# Patient Record
Sex: MALE | Race: WHITE | Employment: FULL TIME | RURAL
[De-identification: names, ages, dates, MRNs, and addresses within clinical notes are randomized per-mention and may not be internally consistent; named-entity substitution may affect disease eponyms.]

---

## 2022-02-14 ENCOUNTER — HOSPITAL ENCOUNTER (EMERGENCY)
Age: 57
Discharge: ANOTHER HOSPITAL | End: 2022-02-15
Attending: FAMILY MEDICINE
Payer: MEDICAID

## 2022-02-14 ENCOUNTER — APPOINTMENT (OUTPATIENT)
Dept: GENERAL RADIOLOGY | Age: 57
End: 2022-02-14
Attending: FAMILY MEDICINE
Payer: MEDICAID

## 2022-02-14 DIAGNOSIS — I20.0 UNSTABLE ANGINA (HCC): Primary | ICD-10-CM

## 2022-02-14 LAB
ALBUMIN SERPL-MCNC: 3.2 G/DL (ref 3.5–5)
ALBUMIN/GLOB SERPL: 0.8 {RATIO} (ref 1.1–2.2)
ALP SERPL-CCNC: 118 U/L (ref 45–117)
ALT SERPL-CCNC: 32 U/L (ref 12–78)
ANION GAP SERPL CALC-SCNC: 14 MMOL/L (ref 5–15)
AST SERPL-CCNC: 18 U/L (ref 15–37)
BASOPHILS # BLD: 0.1 K/UL (ref 0–0.1)
BASOPHILS NFR BLD: 1 % (ref 0–1)
BILIRUB SERPL-MCNC: 0.3 MG/DL (ref 0.2–1)
BUN SERPL-MCNC: 18 MG/DL (ref 6–20)
BUN/CREAT SERPL: 15 (ref 12–20)
CALCIUM SERPL-MCNC: 8.3 MG/DL (ref 8.5–10.1)
CHLORIDE SERPL-SCNC: 100 MMOL/L (ref 97–108)
CO2 SERPL-SCNC: 25 MMOL/L (ref 21–32)
CREAT SERPL-MCNC: 1.21 MG/DL (ref 0.7–1.3)
DIFFERENTIAL METHOD BLD: ABNORMAL
EOSINOPHIL # BLD: 0.4 K/UL (ref 0–0.4)
EOSINOPHIL NFR BLD: 3 % (ref 0–7)
ERYTHROCYTE [DISTWIDTH] IN BLOOD BY AUTOMATED COUNT: 14 % (ref 11.5–14.5)
GLOBULIN SER CALC-MCNC: 4 G/DL (ref 2–4)
GLUCOSE SERPL-MCNC: 132 MG/DL (ref 65–100)
HCT VFR BLD AUTO: 47.8 % (ref 36.6–50.3)
HGB BLD-MCNC: 16 G/DL (ref 12.1–17)
IMM GRANULOCYTES # BLD AUTO: 0.1 K/UL (ref 0–0.04)
IMM GRANULOCYTES NFR BLD AUTO: 1 % (ref 0–0.5)
LYMPHOCYTES # BLD: 4.2 K/UL (ref 0.8–3.5)
LYMPHOCYTES NFR BLD: 32 % (ref 12–49)
MAGNESIUM SERPL-MCNC: 1.6 MG/DL (ref 1.6–2.4)
MCH RBC QN AUTO: 28.4 PG (ref 26–34)
MCHC RBC AUTO-ENTMCNC: 33.5 G/DL (ref 30–36.5)
MCV RBC AUTO: 84.8 FL (ref 80–99)
MONOCYTES # BLD: 1 K/UL (ref 0–1)
MONOCYTES NFR BLD: 7 % (ref 5–13)
NEUTS SEG # BLD: 7.4 K/UL (ref 1.8–8)
NEUTS SEG NFR BLD: 56 % (ref 32–75)
NRBC # BLD: 0 K/UL (ref 0–0.01)
NRBC BLD-RTO: 0 PER 100 WBC
PLATELET # BLD AUTO: 173 K/UL (ref 150–400)
PMV BLD AUTO: 9.4 FL (ref 8.9–12.9)
POTASSIUM SERPL-SCNC: 3.3 MMOL/L (ref 3.5–5.1)
PROT SERPL-MCNC: 7.2 G/DL (ref 6.4–8.2)
RBC # BLD AUTO: 5.64 M/UL (ref 4.1–5.7)
SODIUM SERPL-SCNC: 139 MMOL/L (ref 136–145)
TROPONIN-HIGH SENSITIVITY: 8 NG/L (ref 0–76)
WBC # BLD AUTO: 13.1 K/UL (ref 4.1–11.1)

## 2022-02-14 PROCEDURE — 99285 EMERGENCY DEPT VISIT HI MDM: CPT

## 2022-02-14 PROCEDURE — 96361 HYDRATE IV INFUSION ADD-ON: CPT

## 2022-02-14 PROCEDURE — 85730 THROMBOPLASTIN TIME PARTIAL: CPT

## 2022-02-14 PROCEDURE — 71045 X-RAY EXAM CHEST 1 VIEW: CPT

## 2022-02-14 PROCEDURE — 96374 THER/PROPH/DIAG INJ IV PUSH: CPT

## 2022-02-14 PROCEDURE — 85025 COMPLETE CBC W/AUTO DIFF WBC: CPT

## 2022-02-14 PROCEDURE — 96365 THER/PROPH/DIAG IV INF INIT: CPT

## 2022-02-14 PROCEDURE — 80053 COMPREHEN METABOLIC PANEL: CPT

## 2022-02-14 PROCEDURE — 36415 COLL VENOUS BLD VENIPUNCTURE: CPT

## 2022-02-14 PROCEDURE — 74011250637 HC RX REV CODE- 250/637: Performed by: FAMILY MEDICINE

## 2022-02-14 PROCEDURE — 74011250636 HC RX REV CODE- 250/636: Performed by: FAMILY MEDICINE

## 2022-02-14 PROCEDURE — 93005 ELECTROCARDIOGRAM TRACING: CPT

## 2022-02-14 PROCEDURE — 84484 ASSAY OF TROPONIN QUANT: CPT

## 2022-02-14 PROCEDURE — 83735 ASSAY OF MAGNESIUM: CPT

## 2022-02-14 RX ORDER — NALOXONE HYDROCHLORIDE 4 MG/.1ML
SPRAY NASAL
COMMUNITY
Start: 2021-08-27 | End: 2022-08-27

## 2022-02-14 RX ORDER — BISACODYL 5 MG
TABLET, DELAYED RELEASE (ENTERIC COATED) ORAL
COMMUNITY
Start: 2022-01-03

## 2022-02-14 RX ORDER — LOSARTAN POTASSIUM 100 MG/1
1 TABLET ORAL DAILY
COMMUNITY
Start: 2021-08-27 | End: 2022-08-27

## 2022-02-14 RX ORDER — OXYCODONE AND ACETAMINOPHEN 5; 325 MG/1; MG/1
TABLET ORAL
COMMUNITY
Start: 2021-08-27 | End: 2022-05-31

## 2022-02-14 RX ORDER — SODIUM CHLORIDE 9 MG/ML
100 INJECTION, SOLUTION INTRAVENOUS ONCE
Status: COMPLETED | OUTPATIENT
Start: 2022-02-14 | End: 2022-02-15

## 2022-02-14 RX ORDER — GUAIFENESIN 100 MG/5ML
162 LIQUID (ML) ORAL
Status: COMPLETED | OUTPATIENT
Start: 2022-02-14 | End: 2022-02-14

## 2022-02-14 RX ORDER — CHLORTHALIDONE 25 MG/1
1 TABLET ORAL DAILY
COMMUNITY
Start: 2021-08-27 | End: 2022-08-27

## 2022-02-14 RX ORDER — PANTOPRAZOLE SODIUM 40 MG/1
1 TABLET, DELAYED RELEASE ORAL DAILY
COMMUNITY
Start: 2021-08-27 | End: 2022-08-27

## 2022-02-14 RX ORDER — POTASSIUM CHLORIDE 750 MG/1
40 TABLET, FILM COATED, EXTENDED RELEASE ORAL
Status: COMPLETED | OUTPATIENT
Start: 2022-02-14 | End: 2022-02-14

## 2022-02-14 RX ORDER — GUAIFENESIN 100 MG/5ML
81 LIQUID (ML) ORAL
COMMUNITY
Start: 2021-08-27 | End: 2022-08-27

## 2022-02-14 RX ORDER — POTASSIUM CHLORIDE 7.45 MG/ML
10 INJECTION INTRAVENOUS
Status: COMPLETED | OUTPATIENT
Start: 2022-02-14 | End: 2022-02-15

## 2022-02-14 RX ORDER — METOPROLOL SUCCINATE 100 MG/1
1 TABLET, EXTENDED RELEASE ORAL DAILY
COMMUNITY
Start: 2021-08-27 | End: 2022-08-27

## 2022-02-14 RX ORDER — ROSUVASTATIN CALCIUM 40 MG/1
40 TABLET, COATED ORAL DAILY
COMMUNITY
Start: 2022-02-08 | End: 2023-02-08

## 2022-02-14 RX ORDER — MELATONIN
25 DAILY
COMMUNITY
Start: 2022-02-08 | End: 2023-02-08

## 2022-02-14 RX ORDER — NITROGLYCERIN 0.4 MG/1
0.4 TABLET SUBLINGUAL
Status: COMPLETED | OUTPATIENT
Start: 2022-02-14 | End: 2022-02-14

## 2022-02-14 RX ORDER — NITROGLYCERIN 0.4 MG/1
0.4 TABLET SUBLINGUAL
COMMUNITY
Start: 2021-08-27 | End: 2022-08-27

## 2022-02-14 RX ORDER — GABAPENTIN 300 MG/1
300 CAPSULE ORAL 3 TIMES DAILY
COMMUNITY

## 2022-02-14 RX ADMIN — SODIUM CHLORIDE 250 ML: 9 INJECTION, SOLUTION INTRAVENOUS at 23:13

## 2022-02-14 RX ADMIN — POTASSIUM CHLORIDE 40 MEQ: 750 TABLET, FILM COATED, EXTENDED RELEASE ORAL at 23:52

## 2022-02-14 RX ADMIN — SODIUM CHLORIDE 100 ML/HR: 9 INJECTION, SOLUTION INTRAVENOUS at 23:36

## 2022-02-14 RX ADMIN — ASPIRIN 81 MG CHEWABLE TABLET 162 MG: 81 TABLET CHEWABLE at 23:13

## 2022-02-14 RX ADMIN — POTASSIUM CHLORIDE 10 MEQ: 7.46 INJECTION, SOLUTION INTRAVENOUS at 23:53

## 2022-02-14 RX ADMIN — NITROGLYCERIN 0.4 MG: 0.4 TABLET, ORALLY DISINTEGRATING SUBLINGUAL at 23:14

## 2022-02-15 VITALS
BODY MASS INDEX: 39.53 KG/M2 | TEMPERATURE: 98 F | OXYGEN SATURATION: 96 % | SYSTOLIC BLOOD PRESSURE: 108 MMHG | DIASTOLIC BLOOD PRESSURE: 67 MMHG | RESPIRATION RATE: 12 BRPM | WEIGHT: 246 LBS | HEIGHT: 66 IN | HEART RATE: 74 BPM

## 2022-02-15 LAB
APTT PPP: 26.5 SEC (ref 22.1–31)
ATRIAL RATE: 93 BPM
CALCULATED P AXIS, ECG09: 23 DEGREES
CALCULATED R AXIS, ECG10: 42 DEGREES
CALCULATED T AXIS, ECG11: 12 DEGREES
DIAGNOSIS, 93000: NORMAL
FLUAV RNA SPEC QL NAA+PROBE: NOT DETECTED
FLUBV RNA SPEC QL NAA+PROBE: NOT DETECTED
P-R INTERVAL, ECG05: 192 MS
Q-T INTERVAL, ECG07: 380 MS
QRS DURATION, ECG06: 98 MS
QTC CALCULATION (BEZET), ECG08: 472 MS
SARS-COV-2, COV2: NOT DETECTED
THERAPEUTIC RANGE,PTTT: NORMAL SECS (ref 58–77)
TROPONIN-HIGH SENSITIVITY: 7 NG/L (ref 0–76)
VENTRICULAR RATE, ECG03: 93 BPM

## 2022-02-15 PROCEDURE — 96375 TX/PRO/DX INJ NEW DRUG ADDON: CPT

## 2022-02-15 PROCEDURE — 36415 COLL VENOUS BLD VENIPUNCTURE: CPT

## 2022-02-15 PROCEDURE — 96366 THER/PROPH/DIAG IV INF ADDON: CPT

## 2022-02-15 PROCEDURE — 74011250636 HC RX REV CODE- 250/636: Performed by: FAMILY MEDICINE

## 2022-02-15 PROCEDURE — 74011250637 HC RX REV CODE- 250/637: Performed by: FAMILY MEDICINE

## 2022-02-15 PROCEDURE — 84484 ASSAY OF TROPONIN QUANT: CPT

## 2022-02-15 PROCEDURE — 87636 SARSCOV2 & INF A&B AMP PRB: CPT

## 2022-02-15 RX ORDER — SODIUM CHLORIDE 9 MG/ML
100 INJECTION, SOLUTION INTRAVENOUS ONCE
Status: COMPLETED | OUTPATIENT
Start: 2022-02-15 | End: 2022-02-15

## 2022-02-15 RX ORDER — ONDANSETRON 2 MG/ML
4 INJECTION INTRAMUSCULAR; INTRAVENOUS
Status: COMPLETED | OUTPATIENT
Start: 2022-02-15 | End: 2022-02-15

## 2022-02-15 RX ORDER — HEPARIN SODIUM 10000 [USP'U]/100ML
8-25 INJECTION, SOLUTION INTRAVENOUS
Status: DISCONTINUED | OUTPATIENT
Start: 2022-02-15 | End: 2022-02-15 | Stop reason: HOSPADM

## 2022-02-15 RX ORDER — HEPARIN SODIUM 1000 [USP'U]/ML
4000 INJECTION, SOLUTION INTRAVENOUS; SUBCUTANEOUS ONCE
Status: COMPLETED | OUTPATIENT
Start: 2022-02-15 | End: 2022-02-15

## 2022-02-15 RX ADMIN — HEPARIN SODIUM 8 UNITS/KG/HR: 10000 INJECTION, SOLUTION INTRAVENOUS at 01:43

## 2022-02-15 RX ADMIN — SODIUM CHLORIDE 500 ML: 9 INJECTION, SOLUTION INTRAVENOUS at 03:44

## 2022-02-15 RX ADMIN — SODIUM CHLORIDE 100 ML/HR: 9 INJECTION, SOLUTION INTRAVENOUS at 04:10

## 2022-02-15 RX ADMIN — NITROGLYCERIN 1 INCH: 20 OINTMENT TOPICAL at 01:13

## 2022-02-15 RX ADMIN — ONDANSETRON 4 MG: 2 INJECTION INTRAMUSCULAR; INTRAVENOUS at 01:06

## 2022-02-15 RX ADMIN — HEPARIN SODIUM 4000 UNITS: 1000 INJECTION INTRAVENOUS; SUBCUTANEOUS at 01:31

## 2022-02-15 NOTE — ED NOTES
TRANSFER - OUT REPORT:    Verbal report given to Tami Bermudez [facility] on Raquel Onofre  being transferred to [facility] ED for urgent transfer       Report consisted of patients Situation, Background, Assessment and   Recommendations(SBAR). Information from the following report(s) SBAR, ED Summary and Intake/Output,Med Rec was reviewed with the receiving nurse. Lines:   Peripheral IV 02/14/22 Anterior;Left;Proximal Forearm (Active)       Peripheral IV 02/15/22 Right Antecubital (Active)   Site Assessment Clean, dry, & intact 02/15/22 0119   Phlebitis Assessment 0 02/15/22 0119   Infiltration Assessment 0 02/15/22 0119   Dressing Status Clean, dry, & intact 02/15/22 0119   Dressing Type Transparent 02/15/22 0119   Hub Color/Line Status Pink;Flushed 02/15/22 0119        Opportunity for questions and clarification was provided.       Patient transported with:   Monitor  O2 @ 2 liters   NS infusion 100ml/hr  Heparin Drip 8.9ml/hr

## 2022-02-15 NOTE — PROGRESS NOTES
8:00AM Began working on obtaining an authorization for transportation for the patient via ambulance to tertiary care through his insurance company. CHAIN OF EVENTS/CALLS:    1. [phone] (spoke to [address] [place] [address])   She couldn't help. Gave me 2 numbers to try. FROILAN Duran 45 [phone]  2. [phone]([facility] to  Dorothy Myers)   She couldn't help. Transferred me to   [phone]: (Spoke to Maral Arrieta.) She transferred    Me to Sid Shannon who gave me 2 more numbers:  3. [phone] and [phone]. 4. [phone]([facility] to [place]) No help at all left   Me on hold. Khalif Banter up and dialed another number    5. [phone]([facility] with Delmus Rolling) She stated   I have to schedule a trip in which they would find   A ride for the patient after being told patient has   Already left. She transferred me to Ambulance   Team: Cr Aldana who put me on hold from 10:15 -1025)                6.   Tillman Bees to see if they could help me but   They could not. Spoke with Miley Rowan who [facility] me [facility]  [facility] [address] who was going to   Get someone from [facility] [place] [place] to call me back. 7. Called Transportation number [phone] gave:    [phone]: (spoke with Zoraida) Couldn't do   Anything.     8.. Called the number I started off with: [phone]    Spoke with Savannah> checked with supervisor to     To tell me for  EMERGENCY TRANSPORTS FROM    ONE FACILITY TO ANOTHER DID NOT NEED    AUTHORIZATION.  8:00 am to 12:15pm.

## 2022-02-15 NOTE — ED NOTES
Chest tightness and nausea resolved.  Pt with history of sleep apnea -SpO2 decreases with rest. Pt placed on oxygen 2 lpm via NC.

## 2022-02-15 NOTE — ED TRIAGE NOTES
Pt states cp off and on x 2 weeks taking NTG frequently. Refused admission when he saw cardiologist at [facility] last week.  Tonight x 2.5 hours took total of 3 NTG last 15 mins before arrival.

## 2022-02-15 NOTE — PROGRESS NOTES
0116 - Dr. Karsten Tee advised to arranged ALS transportation from [facility] to [facility] ED.   Arranged ALS transport w/JAMILA Gerardo) - advised to bring appropiate equipment (cardiac monitor, IVPump for Heparin drip, oxygen) - ETA will be called back by [facility] supervisor Claudeen Memory - updated ED staff w/ETA

## 2022-02-15 NOTE — ED PROVIDER NOTES
EMERGENCY DEPARTMENT HISTORY AND PHYSICAL EXAM          Date: 2/14/2022  Patient Name: Jazz Aguirre    History of Presenting Illness     Chief Complaint   Patient presents with    Chest Pain       History Provided By: Patient    HPI: Jazz Aguirre is a 65 y.o. male, pmhx CAD, chronic pain, who comes to the ED tonight with chest pain that started about 2 or 3 hours ago. He reports that he was at [facility] about 2 weeks ago and his doctor, whose name he cannot recall, wanted to admit him at that time. He refused admission, but since then he has had chest pain intermittently. His friend brought him to the ED for evaluation. He has had no dyspnea, diaphoresis, or nausea. Tonight over the last three hours he has had 3 nitroglycerin tabs. He smokes a ppd of cigarettes. His last stent was about 4 years ago; he knows he has had 2 stents, but is not sure. Pain is around a 2 or 3 at present. PCP: Braden, Not On File, PA-C    Allergies: hydrocodone, isosorbide, cephalexin. Social Hx: 1 ppd tobacco, denies vaping, EtOH, Illicit Drugs; Lives locally. There are no other complaints, changes, or physical findings at this time. Current Facility-Administered Medications   Medication Dose Route Frequency Provider Last Rate Last Admin    heparin (porcine) 25,000 units in 0.45% saline 250 ml infusion  8-25 Units/kg/hr IntraVENous TITRATE Ivana Richards MD 8.9 mL/hr at 02/15/22 0143 8 Units/kg/hr at 02/15/22 0143     Current Outpatient Medications   Medication Sig Dispense Refill    oxyCODONE-acetaminophen (PERCOCET) 5-325 mg per tablet **EARLIEST FILL 2/10 TAKE 1 TABLET BY MOUTH EVERY 4 HOURS AS NEEDED FOR PAIN      nitroglycerin (NITROSTAT) 0.4 mg SL tablet 0.4 mg.      naloxone (NARCAN) 4 mg/actuation nasal spray GIVE 1 SPRAY INTO ONE NOSTRIL. CALL 911. GIVE ADDITIONAL DOSES USING A NEW NASAL SPRAY IF PATIENT DOES NOT RESPOND, OR RESPONDS THEN RELAPSES, EVERY 2 TO 3 MINUTES UNTIL HELP ARRIVES.       metoprolol succinate (TOPROL-XL) 100 mg tablet Take 1 Tablet by mouth daily.  losartan (COZAAR) 100 mg tablet Take 1 Tablet by mouth daily.  empagliflozin (JARDIANCE) 10 mg tablet Take 1 Tablet by mouth daily.  cholecalciferol (VITAMIN D3) (1000 Units /25 mcg) tablet Take 25 mcg by mouth daily.  chlorthalidone (HYGROTON) 25 mg tablet Take 1 Tablet by mouth daily.  bisacodyL (DULCOLAX) 5 mg EC tablet Do not crush, chew, or split. Take as directed      aspirin 81 mg chewable tablet 81 mg.  pantoprazole (PROTONIX) 40 mg tablet Take 1 Tablet by mouth daily.  rosuvastatin (CRESTOR) 40 mg tablet Take 40 mg by mouth daily.  gabapentin (NEURONTIN) 300 mg capsule Take 300 mg by mouth three (3) times daily. Past History     Past Medical History:  Past Medical History:   Diagnosis Date    Anxiety     BPH (benign prostatic hyperplasia)     CAD (coronary artery disease)     Chronic low back pain     Diabetes ([facility] [address]     Dyslipidemia     HTN (hypertension)     Neuropathic pain     Obesity     BERNA (obstructive sleep apnea)     Osteoarthritis     Tobacco abuse        Past Surgical History:  History reviewed. No pertinent surgical history. Family History:  History reviewed. No pertinent family history. Social History:  Social History     Tobacco Use    Smoking status: Current Every Day Smoker     Packs/day: 1.00    Smokeless tobacco: Former User   Substance Use Topics    Alcohol use: Not Currently    Drug use: Not Currently       Allergies: Allergies   Allergen Reactions    Hydrocodone Anaphylaxis     Throat swells    Isosorbide Mononitrate Other (comments)     Severe HA    Keflex [Cephalexin] Diarrhea         Review of Systems   Review of Systems   Constitutional: Negative for appetite change, chills, fatigue and fever. HENT: Negative for congestion, ear pain, rhinorrhea, sore throat and trouble swallowing. Eyes: Negative for redness and visual disturbance. Respiratory: Negative for cough, chest tightness, shortness of breath and wheezing. Cardiovascular: Positive for chest pain. Negative for leg swelling. Gastrointestinal: Negative for abdominal pain, nausea and vomiting. Endocrine: Negative for cold intolerance, heat intolerance and polyuria. Genitourinary: Negative for dysuria, flank pain, frequency, hematuria and testicular pain. Musculoskeletal: Positive for back pain and neck pain. Negative for arthralgias and myalgias. Chronic back, neck pain. Unchanged from usual.   Skin: Negative for rash. Allergic/Immunologic: Negative for environmental allergies and food allergies. Neurological: Negative for dizziness, weakness and headaches. Physical Exam   Physical Exam  Vitals reviewed. Constitutional:       General: He is not in acute distress. Appearance: He is well-developed. He is not diaphoretic. HENT:      Head: Normocephalic and atraumatic. Right Ear: External ear normal.      Left Ear: External ear normal.      Mouth/Throat:      Pharynx: No oropharyngeal exudate. Eyes:      General: No scleral icterus. Right eye: No discharge. Left eye: No discharge. Conjunctiva/sclera: Conjunctivae normal.      Pupils: Pupils are equal, round, and reactive to light. Neck:      Thyroid: No thyromegaly. Vascular: No JVD. Trachea: No tracheal deviation. Cardiovascular:      Rate and Rhythm: Normal rate and regular rhythm. Pulses:           Radial pulses are 2+ on the right side and 2+ on the left side. Dorsalis pedis pulses are 2+ on the right side and 2+ on the left side. Posterior tibial pulses are 2+ on the right side and 2+ on the left side. Heart sounds: Normal heart sounds. No murmur heard. No systolic murmur is present. No diastolic murmur is present. No friction rub. No gallop. Pulmonary:      Effort: Pulmonary effort is normal. No respiratory distress.       Breath sounds: No wheezing, rhonchi or rales. Abdominal:      General: Bowel sounds are normal. There is no distension. Palpations: Abdomen is soft. Tenderness: There is no abdominal tenderness. There is no rebound. Musculoskeletal:         General: No tenderness or deformity. Normal range of motion. Cervical back: Normal range of motion and neck supple. Right lower leg: No edema. Left lower leg: No edema. Skin:     General: Skin is warm and dry. Neurological:      Mental Status: He is alert and oriented to person, place, and time. Cranial Nerves: No cranial nerve deficit. Coordination: Coordination normal.      Deep Tendon Reflexes: Reflexes are normal and symmetric. Diagnostic Study Results     Labs -     Recent Results (from the past 12 hour(s))   CBC WITH AUTOMATED DIFF    Collection Time: 02/14/22 11:05 PM   Result Value Ref Range    WBC 13.1 (H) 4.1 - 11.1 K/uL    RBC 5.64 4.10 - 5.70 M/uL    HGB 16.0 12.1 - 17.0 g/dL    HCT 47.8 36.6 - 50.3 %    MCV 84.8 80.0 - 99.0 FL    MCH 28.4 26.0 - 34.0 PG    MCHC 33.5 30.0 - 36.5 g/dL    RDW 14.0 11.5 - 14.5 %    PLATELET 994 771 - 809 K/uL    MPV 9.4 8.9 - 12.9 FL    NRBC 0.0 0  WBC    ABSOLUTE NRBC 0.00 0.00 - 0.01 K/uL    NEUTROPHILS 56 32 - 75 %    LYMPHOCYTES 32 12 - 49 %    MONOCYTES 7 5 - 13 %    EOSINOPHILS 3 0 - 7 %    BASOPHILS 1 0 - 1 %    IMMATURE GRANULOCYTES 1 (H) 0.0 - 0.5 %    ABS. NEUTROPHILS 7.4 1.8 - 8.0 K/UL    ABS. LYMPHOCYTES 4.2 (H) 0.8 - 3.5 K/UL    ABS. MONOCYTES 1.0 0.0 - 1.0 K/UL    ABS. EOSINOPHILS 0.4 0.0 - 0.4 K/UL    ABS. BASOPHILS 0.1 0.0 - 0.1 K/UL    ABS. IMM.  GRANS. 0.1 (H) 0.00 - 0.04 K/UL    DF AUTOMATED     METABOLIC PANEL, COMPREHENSIVE    Collection Time: 02/14/22 11:05 PM   Result Value Ref Range    Sodium 139 136 - 145 mmol/L    Potassium 3.3 (L) 3.5 - 5.1 mmol/L    Chloride 100 97 - 108 mmol/L    CO2 25 21 - 32 mmol/L    Anion gap 14 5 - 15 mmol/L    Glucose 132 (H) 65 - 100 mg/dL    BUN 18 6 - 20 MG/DL    Creatinine 1.21 0.70 - 1.30 MG/DL    BUN/Creatinine ratio 15 12 - 20      GFR est AA >60 >60 ml/min/1.73m2    GFR est non-AA >60 >60 ml/min/1.73m2    Calcium 8.3 (L) 8.5 - 10.1 MG/DL    Bilirubin, total 0.3 0.2 - 1.0 MG/DL    ALT (SGPT) 32 12 - 78 U/L    AST (SGOT) 18 15 - 37 U/L    Alk. phosphatase 118 (H) 45 - 117 U/L    Protein, total 7.2 6.4 - 8.2 g/dL    Albumin 3.2 (L) 3.5 - 5.0 g/dL    Globulin 4.0 2.0 - 4.0 g/dL    A-G Ratio 0.8 (L) 1.1 - 2.2     TROPONIN-HIGH SENSITIVITY    Collection Time: 02/14/22 11:05 PM   Result Value Ref Range    Troponin-High Sensitivity 8 0 - 76 ng/L   MAGNESIUM    Collection Time: 02/14/22 11:05 PM   Result Value Ref Range    Magnesium 1.6 1.6 - 2.4 mg/dL   PTT    Collection Time: 02/14/22 11:05 PM   Result Value Ref Range    aPTT 26.5 22.1 - 31.0 sec    aPTT, therapeutic range     58.0 - 77.0 SECS   TROPONIN-HIGH SENSITIVITY    Collection Time: 02/15/22  1:05 AM   Result Value Ref Range    Troponin-High Sensitivity 7 0 - 76 ng/L   COVID-19 WITH INFLUENZA A/B    Collection Time: 02/15/22  1:08 AM   Result Value Ref Range    SARS-CoV-2 Not detected NOTD      Influenza A by PCR Not detected NOTD      Influenza B by PCR Not detected NOTD         Radiologic Studies -   XR CHEST PORT   Final Result   No acute findings. CT Results  (Last 48 hours)    None        CXR Results  (Last 48 hours)               02/14/22 2328  XR CHEST PORT Final result    Impression:  No acute findings. Narrative:  EXAM: XR CHEST PORT       INDICATION: chest pain       COMPARISON: None. FINDINGS: A portable AP radiograph of the chest was obtained at 23:01 hours. The   patient is on a cardiac monitor. The lungs are clear.  The cardiac and   mediastinal contours and pulmonary vascularity are normal.  The bones and soft   tissues show degenerative spine change with lower cervical ACDF plate and screws   and osteoarthritic change of the shoulders but otherwise are grossly within   normal limits. Medical Decision Making   I am the first provider for this patient. I reviewed the vital signs, available nursing notes, past medical history, past surgical history, family history and social history. Vital Signs-Reviewed the patient's vital signs. Patient Vitals for the past 12 hrs:   Temp Pulse Resp BP SpO2   02/15/22 0234  85 20 121/62 91 %   02/15/22 0219  84 24 130/85 94 %   02/15/22 0204  84 24 116/82 96 %   02/15/22 0149  85 25 122/79 94 %   02/15/22 0140  79 22  97 %   02/15/22 0134  81 22  93 %   02/15/22 0125  78 20 138/84 94 %   02/15/22 0119  83 21  90 %   02/15/22 0113  85  109/69    02/15/22 0110  86 11 109/69 94 %   02/15/22 0104  84 14 122/89 91 %   02/15/22 0055  90 23 111/80    02/15/22 0049  84 23     02/15/22 0040  83 25 128/81    02/15/22 0034  81 14 114/61 96 %   02/15/22 0025  85 19  93 %   02/15/22 0019  88 23 128/88 92 %   02/15/22 0010  88 22  91 %   02/15/22 0004  89 19 128/88 95 %   02/14/22 2355  90 14  92 %   02/14/22 2349  92 26 127/79 95 %   02/14/22 2340  88 16  95 %   02/14/22 2334  88 24 135/89 93 %   02/14/22 2325  90 22 106/68 94 %   02/14/22 2319  94 16 106/68 93 %   02/14/22 2314  89  119/72    02/14/22 2310  90 18 119/72 94 %   02/14/22 2304  95 15 133/77 99 %   02/14/22 2259 98.7 °F (37.1 °C) 97 20 133/77 97 %       Pulse Oximetry Analysis - 97% on RA    Cardiac Monitor:   Rate: 90 bpm  Rhythm: Normal Sinus Rhythm      Records Reviewed: Nursing Notes, Old Medical Records, Previous Radiology Studies and Previous Laboratory Studies    Provider Notes (Medical Decision Making):   MDM: Pt is a 1 ppd smoker with a h/o CAD, who has had chest pain for the last two weeks, more severe this evening. EKG looks normal. Will give aspirin, nitroglycerin, and send labs. ED Course:   Initial assessment performed.  The patients presenting problems have been discussed, and they are in agreement with the care plan formulated and outlined with them. I have encouraged them to ask questions as they arise throughout their visit. EKG interpretation: (Preliminary)  Rhythm: NSR, HR 93, No ST changes. Low voltage. This EKG was interpreted by ED Provider Dr Petar Corona MD    11:07 PM   Spent 3 minutes discussing the risks of smoking and the benefits of smoking cessation as well as the long term sequelae of smoking with the pt who verbalized his understanding. Reviewed strategies for success, including gradually decreasing the number of cigarettes smoked a day. PROGRESS NOTE:    ED Course as of 02/15/22 0321   Mon Feb 14, 2022   2332 Pain down to 0 after a single nitroglycerin. [VG]   2074 Troponin 8. Pain level 1. Will call [facility] to discuss possible transfer. [VG]   Tue Feb 15, 2022   [address] [facility] CICU fellow. [VG]   0120 Case discussed with Dr. Neto Yin in CICU, who felt that patient was appropriate for transfer but no beds available in the CICU. Accepted by Dr. Michael Rider in ED at [facility]. [VG]   0319 Resting comfortably. First estimate for [facility] ETA was 0600 at around 0200. Have been able to move it up to 0500. Pt resting comfortably. Repeat troponin 7. COVID negative. [VG]      ED Course User Index  [VG] Sandie Pal MD          CRITICAL CARE NOTE :    1:25 AM      IMPENDING DETERIORATION -Respiratory, Cardiovascular and Metabolic    ASSOCIATED RISK FACTORS - Hypotension, Shock, Dysrhythmia and Vascular Compromise    MANAGEMENT- Bedside Assessment, Supervision of Care and Transfer    INTERPRETATION -  Xrays, ECG and Blood Pressure    INTERVENTIONS - hemodynamic mngmt and vascular control    CASE REVIEW - Hospitalist/Intensivist and Medical Sub-Specialist    TREATMENT RESPONSE -Improved    PERFORMED BY - Self    I have spent 60 minutes of critical care time involved in lab review, consultations with specialist, family decision- making, bedside attention and documentation.  During this entire length of time I was immediately available to the patient . Diagnosis     Clinical Impression:   1. Unstable angina (HCC)        PLAN:  Transfer [facility] ED     Aspirin, Nitropaste (1\"), Heparin infusion.

## 2022-02-15 NOTE — PROGRESS NOTES
0200 - Dr Claudetta Pyles upgraded transport to [address] [place] [address] - notified Kerri Fisherle w/[facility] - ETA remains to same, 0600 - updated ED    Tomer Nava (supervisor at [facility]) gave #[phone] to escalade - spoke w/Ravi Irizarry w/[facility] - ETA at 5-6am - advised [facility] Estephania Bryant RN) and ED Staff & Dr. Claudetta Pyles.       3235 - Dr. Claudetta Pyles asked writer to call [address] to see if [address] [place] [address] truck is available to take pt to [facility] - due to the time, [facility] will arrive the soonest

## 2022-12-05 ENCOUNTER — HOSPITAL ENCOUNTER (EMERGENCY)
Age: 57
Discharge: HOME | End: 2022-12-05
Attending: EMERGENCY MEDICINE
Payer: MEDICAID

## 2022-12-05 VITALS
RESPIRATION RATE: 20 BRPM | DIASTOLIC BLOOD PRESSURE: 77 MMHG | SYSTOLIC BLOOD PRESSURE: 155 MMHG | OXYGEN SATURATION: 91 % | TEMPERATURE: 98.3 F | HEIGHT: 66 IN | BODY MASS INDEX: 40.66 KG/M2 | WEIGHT: 253 LBS | HEART RATE: 102 BPM

## 2022-12-05 DIAGNOSIS — S61.412A LACERATION OF LEFT HAND WITHOUT FOREIGN BODY, INITIAL ENCOUNTER: Primary | ICD-10-CM

## 2022-12-05 PROCEDURE — 75810000294 HC INTERM/LAYERED WND RPR

## 2022-12-05 PROCEDURE — 74011000250 HC RX REV CODE- 250: Performed by: EMERGENCY MEDICINE

## 2022-12-05 PROCEDURE — 99283 EMERGENCY DEPT VISIT LOW MDM: CPT

## 2022-12-05 RX ORDER — FLUTICASONE PROPIONATE AND SALMETEROL XINAFOATE 115; 21 UG/1; UG/1
AEROSOL, METERED RESPIRATORY (INHALATION)
COMMUNITY
Start: 2022-11-23

## 2022-12-05 RX ORDER — POTASSIUM CHLORIDE 750 MG/1
TABLET, FILM COATED, EXTENDED RELEASE ORAL
COMMUNITY
Start: 2022-03-07

## 2022-12-05 RX ORDER — EMPAGLIFLOZIN 10 MG/1
TABLET, FILM COATED ORAL
COMMUNITY
Start: 2022-11-03

## 2022-12-05 RX ORDER — GUAIFENESIN 100 MG/5ML
LIQUID (ML) ORAL
COMMUNITY
Start: 2022-11-15

## 2022-12-05 RX ORDER — OXYCODONE AND ACETAMINOPHEN 5; 325 MG/1; MG/1
1 TABLET ORAL
COMMUNITY
Start: 2023-01-14 | End: 2023-02-13

## 2022-12-05 RX ORDER — DOXYCYCLINE HYCLATE 100 MG
100 TABLET ORAL 2 TIMES DAILY
Qty: 14 TABLET | Refills: 0 | Status: SHIPPED | OUTPATIENT
Start: 2022-12-05 | End: 2022-12-12

## 2022-12-05 RX ORDER — PREGABALIN 75 MG/1
150 CAPSULE ORAL 2 TIMES DAILY
COMMUNITY
Start: 2022-11-17 | End: 2023-02-15

## 2022-12-05 RX ORDER — METOPROLOL TARTRATE 25 MG/1
TABLET, FILM COATED ORAL
COMMUNITY
Start: 2022-11-15

## 2022-12-05 RX ORDER — AMLODIPINE BESYLATE 5 MG/1
TABLET ORAL
COMMUNITY
Start: 2022-11-15

## 2022-12-05 RX ORDER — CEPHALEXIN 500 MG/1
500 CAPSULE ORAL 4 TIMES DAILY
Qty: 28 CAPSULE | Refills: 0 | Status: SHIPPED | OUTPATIENT
Start: 2022-12-05 | End: 2022-12-05 | Stop reason: SINTOL

## 2022-12-05 RX ORDER — EZETIMIBE 10 MG/1
10 TABLET ORAL AT BEDTIME
COMMUNITY
Start: 2022-03-07 | End: 2023-03-07

## 2022-12-05 RX ORDER — LIDOCAINE HYDROCHLORIDE 10 MG/ML
10 INJECTION INFILTRATION; PERINEURAL ONCE
Status: COMPLETED | OUTPATIENT
Start: 2022-12-05 | End: 2022-12-05

## 2022-12-05 RX ORDER — CLOPIDOGREL BISULFATE 75 MG/1
TABLET ORAL
COMMUNITY
Start: 2022-11-15

## 2022-12-05 RX ORDER — ALBUTEROL SULFATE 90 UG/1
AEROSOL, METERED RESPIRATORY (INHALATION)
COMMUNITY
Start: 2022-11-03

## 2022-12-05 RX ORDER — PANTOPRAZOLE SODIUM 40 MG/1
1 TABLET, DELAYED RELEASE ORAL DAILY
COMMUNITY
Start: 2022-03-07 | End: 2023-03-07

## 2022-12-05 RX ORDER — FUROSEMIDE 40 MG/1
TABLET ORAL
COMMUNITY
Start: 2022-11-15

## 2022-12-05 RX ADMIN — LIDOCAINE HYDROCHLORIDE 10 ML: 10 INJECTION, SOLUTION INFILTRATION; PERINEURAL at 18:27

## 2022-12-05 RX ADMIN — BACITRACIN ZINC, NEOMYCIN, POLYMYXIN B SULFAT 1 PACKET: 5000; 3.5; 4 OINTMENT TOPICAL at 19:10

## 2022-12-05 NOTE — ED TRIAGE NOTES
Pt presents with a laceration to the left hand from a table saw. Pt was cutting a piece of wood and cut it. Pt states he has had a tetanus in the last 10 years but doesn't remember the year.

## 2022-12-06 NOTE — ED PROVIDER NOTES
EMERGENCY DEPARTMENT HISTORY AND PHYSICAL EXAM          Date: 12/5/2022  Patient Name: Renetta Edmond    History of Presenting Illness     Chief Complaint   Patient presents with    Hand Laceration       History Provided By: Patient    HPI: Renetta Edmond is a 65 y.o. male, pmhx listed below, who presents to the ED c/o laceration. Patient reports he was using a saw when he accidentally cut his left hand. No other injuries. Not taking any blood thinning medications. Applied pressure prior to arrival.  Reports last tetanus was within the last few years. PCP: Braden, Not On File, MD    There are no other complaints, changes, or physical findings at this time. Past History       Past Medical History:  Past Medical History:   Diagnosis Date    Anxiety     BPH (benign prostatic hyperplasia)     CAD (coronary artery disease)     Chronic low back pain     Diabetes (HCC)     Dyslipidemia     HTN (hypertension)     Neuropathic pain     Obesity     BERNA (obstructive sleep apnea)     Osteoarthritis     Tobacco abuse        Past Surgical History:  History reviewed. No pertinent surgical history. Family History:  History reviewed. No pertinent family history. Social History:  Social History     Tobacco Use    Smoking status: Every Day     Packs/day: 1.00     Types: Cigarettes    Smokeless tobacco: Former   Substance Use Topics    Alcohol use: Not Currently    Drug use: Not Currently       Current Outpatient Medications   Medication Sig Dispense Refill    ezetimibe (ZETIA) 10 mg tablet Take 10 mg by mouth At bedtime. pantoprazole (PROTONIX) 40 mg tablet Take 1 Tablet by mouth daily. potassium chloride SR (KLOR-CON 10) 10 mEq tablet Take 1 tablet by mouth 2 times a day for 5 days, THEN take 1 tablet by mouth once daily for 7 days, and then stop. pregabalin (LYRICA) 75 mg capsule Take 150 mg by mouth two (2) times a day.       doxycycline (VIBRA-TABS) 100 mg tablet Take 1 Tablet by mouth two (2) times a day for 7 days. 14 Tablet 0    albuterol (PROVENTIL HFA, VENTOLIN HFA, PROAIR HFA) 90 mcg/actuation inhaler       amLODIPine (NORVASC) 5 mg tablet       aspirin 81 mg chewable tablet       clopidogreL (PLAVIX) 75 mg tab       Jardiance 10 mg tablet       Advair -21 mcg/actuation inhaler       furosemide (LASIX) 40 mg tablet       metoprolol tartrate (LOPRESSOR) 25 mg tablet       [START ON 1/14/2023] oxyCODONE-acetaminophen (PERCOCET) 5-325 mg per tablet Take 1 Tablet by mouth every four (4) hours as needed. gabapentin (NEURONTIN) 300 mg capsule Take 300 mg by mouth three (3) times daily. cholecalciferol (VITAMIN D3) (1000 Units /25 mcg) tablet Take 25 mcg by mouth daily. bisacodyL (DULCOLAX) 5 mg EC tablet Do not crush, chew, or split. Take as directed      rosuvastatin (CRESTOR) 40 mg tablet Take 40 mg by mouth daily. Allergies: Allergies   Allergen Reactions    Hydrocodone Anaphylaxis     Throat swells    Isosorbide Mononitrate Other (comments)     Severe HA    Keflex [Cephalexin] Diarrhea         Review of Systems   Review of Systems   Constitutional:  Negative for chills and fever. HENT:  Negative for ear pain. Eyes:  Negative for pain. Respiratory:  Negative for shortness of breath. Cardiovascular:  Negative for chest pain. Gastrointestinal:  Negative for abdominal pain. Genitourinary:  Negative for flank pain. Musculoskeletal:  Negative for back pain. Skin:  Negative for rash. Neurological:  Negative for headaches. Psychiatric/Behavioral:  Negative for agitation. Physical Exam     Vital Signs-Reviewed the patient's vital signs. No data found. Physical Exam  Constitutional:       Appearance: Normal appearance. He is obese. HENT:      Head: Normocephalic and atraumatic. Mouth/Throat:      Mouth: Mucous membranes are moist.   Eyes:      Conjunctiva/sclera: Conjunctivae normal.   Cardiovascular:      Rate and Rhythm: Normal rate. Pulmonary:      Effort: Pulmonary effort is normal.   Musculoskeletal:      Comments: 8 cm laceration to dorsum of left hand with no exposed tendon. Flexion and extension intact in all fingers. Distal sensation intact in all fingers. Good cap refill in all fingers. No active bleeding from wound. Skin:     General: Skin is warm. Neurological:      Mental Status: He is alert and oriented to person, place, and time. Sensory: No sensory deficit. Motor: No weakness. Diagnostic Study Results     Labs -   No results found for this or any previous visit (from the past 12 hour(s)). Radiologic Studies -   No orders to display     CT Results  (Last 48 hours)      None          CXR Results  (Last 48 hours)      None              Medical Decision Making   I am the first provider for this patient. I reviewed the vital signs, available nursing notes, past medical history, past surgical history, family history and social history. Records Reviewed: Nursing Notes and Old Medical Records    Provider Notes (Medical Decision Making):   MDM: 78-year-old male with hand laceration. No tenderness injury evident on strength testing or examination of laceration. No nerve injury evident with no weakness or numbness noted. We will plan for laceration repair now. Initial assessment performed. The patients presenting problems have been discussed, and they are in agreement with the care plan formulated and outlined with them. I have encouraged them to ask questions as they arise throughout their visit. PROGRESS NOTE:  Patient tolerated repair. Wound dressed with bulky dressing by nurse. Plan for discharge home with removal of stitches in 10 to 14 days. Discharge note:  Pt re-evaluated and noted to be feeling better, ready for discharge. Updated pt on all final results. Will follow up as instructed. All questions have been answered, pt voiced understanding and agreement with plan.  Specific return precautions provided as well as instructions to return to the ED should sx worsen at any time. Vital signs stable for discharge. Wound Repair    Date/Time: 12/6/2022 7:13 AM  Performed by: attendingPreparation: skin prepped with Betadine  Location details: left hand  Wound length:7.6 - 12.5 cm    Anesthesia:  Local Anesthetic: lidocaine 1% without epinephrine  Anesthetic total: 5 mL  Irrigation solution: saline  Irrigation method: jet lavage  Debridement: extensive  Skin closure: 4-0 nylon  Number of sutures: 6  Technique: simple  Approximation: close  Dressing: antibiotic ointment and gauze roll  My total time at bedside, performing this procedure was 16-30 minutes. Diagnosis     Clinical Impression:   1. Laceration of left hand without foreign body, initial encounter            Disposition:  Discharged    Discharge Medication List as of 12/5/2022  7:33 PM            Please note, this dictation was completed with [employer], the [employer] voice recognition software. Quite often unanticipated grammatical, syntax, homophones, and other interpretive errors are inadvertently transcribed by the computer software. Please disregard these errors. Please excuse any errors that have escaped final proof reading.

## 2022-12-06 NOTE — ED NOTES
Wound care done on pt's left hand. Bactrim applied, petrolatum  And bulkee dressing applied. Pt tolerated well. Pt instructing on how to keep hand clean and how to do his wound care.

## 2022-12-06 NOTE — ED NOTES
Pt's hand washed and soaked in NS and Iodine prior to MD suturing hand. Pt given lidocaine by MD and sutured by MD. Pt tolerated well. Pt given 5 stitches. Pt educated on keeping hand dry and clean for the first 48 hours and taking stitches out in 10 days. Pt verbalize understanding.